# Patient Record
Sex: FEMALE | Race: BLACK OR AFRICAN AMERICAN
[De-identification: names, ages, dates, MRNs, and addresses within clinical notes are randomized per-mention and may not be internally consistent; named-entity substitution may affect disease eponyms.]

---

## 2018-01-11 ENCOUNTER — HOSPITAL ENCOUNTER (EMERGENCY)
Dept: HOSPITAL 5 - ED | Age: 73
LOS: 1 days | Discharge: HOME | End: 2018-01-12
Payer: MEDICARE

## 2018-01-11 DIAGNOSIS — N39.0: ICD-10-CM

## 2018-01-11 DIAGNOSIS — Z95.818: ICD-10-CM

## 2018-01-11 DIAGNOSIS — I25.2: ICD-10-CM

## 2018-01-11 DIAGNOSIS — E11.9: ICD-10-CM

## 2018-01-11 DIAGNOSIS — I10: ICD-10-CM

## 2018-01-11 DIAGNOSIS — R53.1: Primary | ICD-10-CM

## 2018-01-11 PROCEDURE — 74176 CT ABD & PELVIS W/O CONTRAST: CPT

## 2018-01-11 PROCEDURE — 80053 COMPREHEN METABOLIC PANEL: CPT

## 2018-01-11 PROCEDURE — 99285 EMERGENCY DEPT VISIT HI MDM: CPT

## 2018-01-11 PROCEDURE — 71045 X-RAY EXAM CHEST 1 VIEW: CPT

## 2018-01-11 PROCEDURE — 93010 ELECTROCARDIOGRAM REPORT: CPT

## 2018-01-11 PROCEDURE — 36415 COLL VENOUS BLD VENIPUNCTURE: CPT

## 2018-01-11 PROCEDURE — 93005 ELECTROCARDIOGRAM TRACING: CPT

## 2018-01-11 PROCEDURE — 72131 CT LUMBAR SPINE W/O DYE: CPT

## 2018-01-11 PROCEDURE — 70450 CT HEAD/BRAIN W/O DYE: CPT

## 2018-01-11 PROCEDURE — 84484 ASSAY OF TROPONIN QUANT: CPT

## 2018-01-11 PROCEDURE — 81001 URINALYSIS AUTO W/SCOPE: CPT

## 2018-01-11 PROCEDURE — 85025 COMPLETE CBC W/AUTO DIFF WBC: CPT

## 2018-01-12 VITALS — DIASTOLIC BLOOD PRESSURE: 45 MMHG | SYSTOLIC BLOOD PRESSURE: 142 MMHG

## 2018-01-12 LAB
ALBUMIN SERPL-MCNC: 2.9 G/DL (ref 3.9–5)
ALT SERPL-CCNC: 18 UNITS/L (ref 7–56)
BASOPHILS # (AUTO): 0 K/MM3 (ref 0–0.1)
BASOPHILS NFR BLD AUTO: 0.3 % (ref 0–1.8)
BILIRUB UR QL STRIP: (no result)
BLOOD UR QL VISUAL: (no result)
BUN SERPL-MCNC: 22 MG/DL (ref 7–17)
BUN/CREAT SERPL: 37 %
CALCIUM SERPL-MCNC: 9 MG/DL (ref 8.4–10.2)
EOSINOPHIL # BLD AUTO: 0.1 K/MM3 (ref 0–0.4)
EOSINOPHIL NFR BLD AUTO: 1 % (ref 0–4.3)
HCT VFR BLD CALC: 33.9 % (ref 30.3–42.9)
HEMOLYSIS INDEX: 6
HGB BLD-MCNC: 11.6 GM/DL (ref 10.1–14.3)
LYMPHOCYTES # BLD AUTO: 1.7 K/MM3 (ref 1.2–5.4)
LYMPHOCYTES NFR BLD AUTO: 20.3 % (ref 13.4–35)
MCH RBC QN AUTO: 31 PG (ref 28–32)
MCHC RBC AUTO-ENTMCNC: 34 % (ref 30–34)
MCV RBC AUTO: 90 FL (ref 79–97)
MONOCYTES # (AUTO): 0.9 K/MM3 (ref 0–0.8)
MONOCYTES % (AUTO): 11.4 % (ref 0–7.3)
MUCOUS THREADS #/AREA URNS HPF: (no result) /HPF
NITRITE UR QL STRIP: (no result)
PH UR STRIP: 6 [PH] (ref 5–7)
PLATELET # BLD: 274 K/MM3 (ref 140–440)
RBC # BLD AUTO: 3.75 M/MM3 (ref 3.65–5.03)
RBC #/AREA URNS HPF: 2 /HPF (ref 0–6)
UROBILINOGEN UR-MCNC: 2 MG/DL (ref ?–2)
WBC #/AREA URNS HPF: 12 /HPF (ref 0–6)

## 2018-01-12 NOTE — CAT SCAN REPORT
FINAL REPORT



PROCEDURE:  CT HEAD/BRAIN WO CON



TECHNIQUE:  Computerized tomography of the head was performed

without contrast material. 



HISTORY:  Weakness 



COMPARISON:  No prior studies are available for comparison.



FINDINGS:  

Skull and scalp: Normal.



Paranasal sinuses: Normal.



Ventricles and subarachnoid spaces: There is moderate central and

cortical atrophy. There is no hydrocephalus..



Cerebrum: No evidence of hemorrhage, acute infarction or mass .



Cerebellum and brainstem: No evidence of hemorrhage, acute

infarction or mass.



Vasculature: Normal.



Comments: None.



IMPRESSION:  

There is no acute intracranial abnormality.

## 2018-01-12 NOTE — CAT SCAN REPORT
FINAL REPORT



PROCEDURE:  CT ABDOMEN PELVIS WO CON



TECHNIQUE:  Computerized axial tomography of the abdomen and

pelvis was performed without intravenous contrast. This study is

performed without intravascular contrast material and its

sensitivity for abdominal and pelvic pathology, including

neoplasms, inflammation, abscess, free fluid, thrombosis,

arterial dissection and infarction, is reduced compared with a

contrast enhanced study. 



HISTORY:  pain 



COMPARISON:  No prior studies are available for comparison.



FINDINGS:  

Visualized lower thorax: No significant abnormality.



Liver: Normal size and attenuation.



Spleen: Normal size and attenuation.



Gallbladder and biliary system: There are gallstones. There is no

cholecystitis or biliary ductal dilatation..



Pancreas: Normal.



Adrenals: Normal.



Kidneys: There are no kidney stones or ureteral stones. There is

no hydronephrosis..



GI tract: There is no bowel obstruction, colitis or enteritis.

The appendix is normal..



Lymph nodes and mesentery: Normal.



Vasculature: There is calcified plaque in the abdominal aorta.

There is no aneurysm..



Bladder: The urinary bladder is distended..



Reproductive organs: Normal.



Peritoneum: There is no ascites or free air, abscess or

adenopathy..



Musculoskeletal structures: No significant abnormality.



Other: None.



IMPRESSION:  





There are gallstones. There is no cholecystitis or biliary ductal

dilatation..



There are no kidney stones or ureteral stones. There is no

hydronephrosis..



There is no bowel obstruction, colitis or enteritis. The appendix

is normal..



The urinary bladder is distended..



There is no ascites or free air, abscess or adenopathy..



.

## 2018-01-12 NOTE — EMERGENCY DEPARTMENT REPORT
- General


Chief complaint: Weakness


Stated complaint: UNABLE TO MOVE LEGS


Time Seen by Provider: 01/12/18 01:11


Source: EMS, 


Mode of arrival: Stretcher


Limitations: Language Barrier





- History of Present Illness


Initial comments: 





Family alf patient states she is weak and  get out of bed states it hurts 

to walk here for evaluation of weakness and not quite right today no history of 

chest pain no headache no stiff neck no fever generalized weakness


-: hour(s)


Location: generalized


Severity: mild, moderate


Associated Symptoms: denies: chest pain, dark stools, diaphoresis, easy bruising

, fever/chills, headaches, loss of appetite, nausea/vomiting, myalgias, rash, 

shortness of breath, syncope





- Related Data


 Home Medications











 Medication  Instructions  Recorded  Confirmed  Last Taken


 


Aspirin [Aspirin TAB] 325 mg PO DAILY 05/01/14 03/10/15 Unknown


 


Carvedilol [Coreg] 25 mg PO BID 05/01/14 03/10/15 Unknown


 


Ranitidine HCl [Zantac] 150 mg PO BID 05/01/14 03/10/15 Unknown


 


Zolpidem [Ambien] 5 mg PO QHS 05/01/14 03/10/15 Unknown


 


metFORMIN [Glucophage] 850 mg PO TID 05/01/14 03/10/15 Unknown


 


Ibuprofen [Motrin 400 MG tab] 400 mg PO Q8H PRN 03/10/15 03/10/15 Unknown


 


Loratadine [Claritin] 10 mg PO QDAY 03/10/15 03/10/15 Unknown








 Previous Rx's











 Medication  Instructions  Recorded  Last Taken  Type


 


Isosorbide Mononitrate [Isosorbide 60 mg PO QDAY #30 tab.er.24h 05/02/14 

Unknown Rx





Mononitrate ER (IMDUR)]    


 


Lisinopril [Zestril TAB] 10 mg PO QDAY #30 tablet 03/11/15 Unknown Rx


 


Rosuvastatin (Nf) [Crestor] 20 mg PO QHS #30 tablet 03/11/15 Unknown Rx


 


Cephalexin [Keflex] 500 mg PO Q6H #28 capsule 03/30/15 Unknown Rx


 


glipiZIDE [glipiZIDE ER] 10 mg PO BID #60 tab.er.24 03/30/15 Unknown Rx


 


Dicyclomine [Bentyl] 10 mg PO QID PRN #20 capsule 11/03/15 Unknown Rx


 


Ondansetron [Zofran Odt] 4 mg PO QID PRN #20 tab.rapdis 11/03/15 Unknown Rx


 


Cephalexin [Keflex] 500 mg PO Q6HR #28 capsule 01/12/18 Unknown Rx











 Allergies











Allergy/AdvReac Type Severity Reaction Status Date / Time


 


No Known Allergies Allergy   Unverified 05/01/14 05:59














ED Review of Systems


ROS: 


Stated complaint: UNABLE TO MOVE LEGS


Other details as noted in HPI





Comment: All other systems reviewed and negative


Eyes: denies: eye discharge, vision change


ENT: denies: dental pain, hearing loss, epistaxis


Respiratory: denies: shortness of breath, SOB with exertion, SOB at rest, 

stridor, wheezing


Cardiovascular: denies: chest pain, palpitations, dyspnea on exertion, orthopnea

, edema, syncope, paroxysmal nocturnal dyspnea


Gastrointestinal: denies: abdominal pain, nausea, vomiting, diarrhea, 

constipation, hematemesis, melena, hematochezia


Neurological: weakness.  denies: headache, numbness, paresthesias, confusion, 

abnormal gait, vertigo





ED Past Medical Hx





- Past Medical History


Previous Medical History?: Yes


Hx Hypertension: Yes


Hx Heart Attack/AMI: Yes


Hx Diabetes: Yes





- Surgical History


Past Surgical History?: Yes


Hx Coronary Stent: Yes





- Social History


Smoking Status: Never Smoker


Substance Use Type: None





- Medications


Home Medications: 


 Home Medications











 Medication  Instructions  Recorded  Confirmed  Last Taken  Type


 


Aspirin [Aspirin TAB] 325 mg PO DAILY 05/01/14 03/10/15 Unknown History


 


Carvedilol [Coreg] 25 mg PO BID 05/01/14 03/10/15 Unknown History


 


Ranitidine HCl [Zantac] 150 mg PO BID 05/01/14 03/10/15 Unknown History


 


Zolpidem [Ambien] 5 mg PO QHS 05/01/14 03/10/15 Unknown History


 


metFORMIN [Glucophage] 850 mg PO TID 05/01/14 03/10/15 Unknown History


 


Isosorbide Mononitrate [Isosorbide 60 mg PO QDAY #30 tab.er.24h 05/02/14 03/10/

15 Unknown Rx





Mononitrate ER (IMDUR)]     


 


Ibuprofen [Motrin 400 MG tab] 400 mg PO Q8H PRN 03/10/15 03/10/15 Unknown 

History


 


Loratadine [Claritin] 10 mg PO QDAY 03/10/15 03/10/15 Unknown History


 


Lisinopril [Zestril TAB] 10 mg PO QDAY #30 tablet 03/11/15  Unknown Rx


 


Rosuvastatin (Nf) [Crestor] 20 mg PO QHS #30 tablet 03/11/15  Unknown Rx


 


Cephalexin [Keflex] 500 mg PO Q6H #28 capsule 03/30/15  Unknown Rx


 


glipiZIDE [glipiZIDE ER] 10 mg PO BID #60 tab.er.24 03/30/15  Unknown Rx


 


Dicyclomine [Bentyl] 10 mg PO QID PRN #20 capsule 11/03/15  Unknown Rx


 


Ondansetron [Zofran Odt] 4 mg PO QID PRN #20 tab.rapdis 11/03/15  Unknown Rx


 


Cephalexin [Keflex] 500 mg PO Q6HR #28 capsule 01/12/18  Unknown Rx














ED Physical Exam





- General


Limitations: Language Barrier


General appearance: alert





- Head


Head exam: Present: atraumatic, normocephalic





- Eye


Eye exam: Present: PERRL, EOMI





- ENT


ENT exam: Present: normal exam, normal orophraynx





- Neck


Neck exam: Present: normal inspection.  Absent: tenderness, meningismus





- Respiratory


Respiratory exam: Present: normal lung sounds bilaterally.  Absent: respiratory 

distress, wheezes, rales, rhonchi, stridor, chest wall tenderness, accessory 

muscle use, decreased breath sounds, prolonged expiratory





- Cardiovascular


Cardiovascular Exam: Present: regular rate, normal heart sounds.  Absent: rubs, 

gallop





- GI/Abdominal


GI/Abdominal exam: Present: soft.  Absent: distended, tenderness, guarding, 

rebound, mass, bruit, pulsatile mass





- Extremities Exam


Extremities exam: Present: normal inspection.  Absent: pedal edema, joint 

swelling, calf tenderness





- Back Exam


Back exam: Present: other (no warmth or erythema supple neck).  Absent: CVA 

tenderness (R), CVA tenderness (L), paraspinal tenderness, vertebral tenderness





- Neurological Exam


Neurological exam: Present: alert, CN II-XII intact.  Absent: motor sensory 

deficit





- Skin


Skin exam: Absent: diaphoretic, erythema, urticaria, vesicles, petechiae, pallor

, abrasion, ecchymosis





ED Course


 Vital Signs











  01/11/18 01/12/18 01/12/18





  23:34 00:01 01:00


 


Temperature 99.3 F  


 


Pulse Rate 72 72 70


 


Respiratory 12 17 17





Rate   


 


Blood Pressure 157/75 160/107 164/74


 


Blood Pressure 157/75  





[Right]   


 


O2 Sat by Pulse 98 97 95





Oximetry   














- Reevaluation(s)


Reevaluation #1: 





01/12/18 04:25


Family states generalized weakness motor strength is equal family thinks having 

some pain when she tries to get out of bed and walk more that she was low but 

less responsive today and here with generalized weakness





ED Medical Decision Making





- Lab Data


Result diagrams: 


 01/12/18 01:40





 01/12/18 01:40





- EKG Data


-: EKG Interpreted by Me


EKG shows normal: sinus rhythm, ST-T waves (no acute ischemic change)





- Radiology Data


Radiology results: report reviewed





- Medical Decision Making





Troponin is negative CT head is no acute process chest x-rays negative 

abdominal CT and lumbar spine CT are chronic change patient is noted to have a 

UTI we will discharge her with antibiotics to see her regular doctor in 2 days 

no acute abdomen at this time no evidence of spinal cord compressive syndrome 

or other emergent process would require admission or further evaluation is 

noted at this time she is averse to 5 days follow-up


Critical care attestation.: 


If time is entered above; I have spent that time in minutes in the direct care 

of this critically ill patient, excluding procedure time.








ED Disposition


Clinical Impression: 


 UTI (urinary tract infection), Weakness





Disposition: DC-01 TO HOME OR SELFCARE


Is pt being admited?: No


Does the pt Need Aspirin: No


Condition: Stable


Instructions:  Weakness (ED), Urinary Tract Infection in Women (ED)


Additional Instructions: 


return if worse


Prescriptions: 


Cephalexin [Keflex] 500 mg PO Q6HR #28 capsule


Referrals: 


SHERMAN MCMULLEN MD [Primary Care Provider] - 3-5 Days


Time of Disposition: 04:30

## 2018-01-12 NOTE — CAT SCAN REPORT
FINAL REPORT



EXAM:  CT LUMBAR SPINE WO CON



HISTORY:  pain 



COMPARISON:  CT of the abdomen and pelvis from the same date. 



TECHNIQUE:  Contiguous axial images were obtained. Additional

sagittal and coronal reformatted images were obtained. 



FINDINGS:  

Lumbar vertebral body heights are preserved. No acute fracture

traumatic subluxation. Marked osteopenia. 



Mild to moderate endplate osteophyte throughout the lumbar spine.

Minimal loss of disc height L5-S1 level. Vacuum disc phenomena

L4-L5 level. 



No significant canal or foraminal narrowing at the T12-L1 and

L1-L2 levels. 



Mild facet changes mild broad-based disc bulge L2-L3 level

causing mild canal stenosis and mild foraminal narrowing. 



L3-L4 level, broad-based disc bulge with facet changes and

hypertrophy lumen flavum causing moderate canal stenosis and mild

bilateral foraminal narrowing. 



L4-L5 level, there is a broad-based disc bulge with

mild-to-moderate facet changes causing moderate canal stenosis.

Mild foraminal narrowing bilaterally. 



L5-S1 level, broad-based disc bulge with endplate osteophyte and

facet changes. Mild to moderate canal stenosis. Moderate to

severe left and mild right foraminal narrowing. 



Visualized SI joints are grossly unremarkable. 



IMPRESSION:  

No acute fracture traumatic subluxation of the lumbar spine. 



Moderate degenerative changes of the lumbar spine most pronounced

at the L4-L5 and L5-S1 levels. Moderate canal stenosis and mild

foraminal narrowing at the L4-L5 level and mild to moderate canal

stenosis with moderate severe left and mild right foraminal

narrowing at the L5-S1 level.

## 2018-01-12 NOTE — XRAY REPORT
FINAL REPORT



PROCEDURE:  XR CHEST 1V AP



TECHNIQUE:  Chest radiograph anteroposterior view. CPT 79914







HISTORY:  Weakness 



COMPARISON:  No prior studies are available for comparison.



FINDINGS:  

Heart: Normal.



Mediastinum/Vessels: Normal.



Lungs/Pleural space: Lungs are clear and expanded. There are no

infiltrates, effusions or pneumothoraces..



Bony thorax: No acute osseous abnormality.



Life support devices: None.



IMPRESSION:  

No acute cardiopulmonary abnormality.

## 2018-04-07 ENCOUNTER — HOSPITAL ENCOUNTER (EMERGENCY)
Dept: HOSPITAL 5 - ED | Age: 73
Discharge: HOME | End: 2018-04-07
Payer: MEDICARE

## 2018-04-07 VITALS — SYSTOLIC BLOOD PRESSURE: 161 MMHG | DIASTOLIC BLOOD PRESSURE: 77 MMHG

## 2018-04-07 DIAGNOSIS — I25.2: ICD-10-CM

## 2018-04-07 DIAGNOSIS — L03.116: Primary | ICD-10-CM

## 2018-04-07 DIAGNOSIS — I10: ICD-10-CM

## 2018-04-07 LAB
BASOPHILS # (AUTO): 0 K/MM3 (ref 0–0.1)
BASOPHILS NFR BLD AUTO: 0.7 % (ref 0–1.8)
BUN SERPL-MCNC: 13 MG/DL (ref 7–17)
BUN/CREAT SERPL: 22 %
CALCIUM SERPL-MCNC: 9.7 MG/DL (ref 8.4–10.2)
EOSINOPHIL # BLD AUTO: 0.1 K/MM3 (ref 0–0.4)
EOSINOPHIL NFR BLD AUTO: 1.3 % (ref 0–4.3)
HCT VFR BLD CALC: 36.5 % (ref 30.3–42.9)
HEMOLYSIS INDEX: 18
HGB BLD-MCNC: 12.1 GM/DL (ref 10.1–14.3)
LYMPHOCYTES # BLD AUTO: 1.7 K/MM3 (ref 1.2–5.4)
LYMPHOCYTES NFR BLD AUTO: 32.3 % (ref 13.4–35)
MCH RBC QN AUTO: 30 PG (ref 28–32)
MCHC RBC AUTO-ENTMCNC: 33 % (ref 30–34)
MCV RBC AUTO: 90 FL (ref 79–97)
MONOCYTES # (AUTO): 0.4 K/MM3 (ref 0–0.8)
MONOCYTES % (AUTO): 8.1 % (ref 0–7.3)
PLATELET # BLD: 427 K/MM3 (ref 140–440)
RBC # BLD AUTO: 4.08 M/MM3 (ref 3.65–5.03)

## 2018-04-07 PROCEDURE — 80048 BASIC METABOLIC PNL TOTAL CA: CPT

## 2018-04-07 PROCEDURE — 99283 EMERGENCY DEPT VISIT LOW MDM: CPT

## 2018-04-07 PROCEDURE — 96372 THER/PROPH/DIAG INJ SC/IM: CPT

## 2018-04-07 PROCEDURE — 36415 COLL VENOUS BLD VENIPUNCTURE: CPT

## 2018-04-07 PROCEDURE — 85025 COMPLETE CBC W/AUTO DIFF WBC: CPT

## 2018-04-07 NOTE — EMERGENCY DEPARTMENT REPORT
- General


Chief complaint: Extremity Injury, Lower


Stated complaint: LEFT FOOT PAIN


Time Seen by Provider: 04/07/18 15:48


Source: family


Mode of arrival: Ambulatory


Limitations: Language Barrier





- History of Present Illness


Initial comments: 





Patient sent here by PCP for left foot pain and redness.


-: week(s) (4)


Location: L foot


Severity: mild


Severity scale (0 -10): 4


Quality: burning


Consistency: constant


Improves with: none


Worsens with: palpation, movement


Associated symptoms: denies other symptoms


Treatments Prior to Arrival: none





- Related Data


 Home Medications











 Medication  Instructions  Recorded  Confirmed  Last Taken


 


Aspirin [Aspirin TAB] 325 mg PO DAILY 05/01/14 03/10/15 Unknown


 


Carvedilol [Coreg] 25 mg PO BID 05/01/14 03/10/15 Unknown


 


Ranitidine HCl [Zantac] 150 mg PO BID 05/01/14 03/10/15 Unknown


 


Zolpidem [Ambien] 5 mg PO QHS 05/01/14 03/10/15 Unknown


 


metFORMIN [Glucophage] 850 mg PO TID 05/01/14 03/10/15 Unknown


 


Ibuprofen [Motrin 400 MG tab] 400 mg PO Q8H PRN 03/10/15 03/10/15 Unknown


 


Loratadine [Claritin] 10 mg PO QDAY 03/10/15 03/10/15 Unknown








 Previous Rx's











 Medication  Instructions  Recorded  Last Taken  Type


 


Isosorbide Mononitrate [Isosorbide 60 mg PO QDAY #30 tab.er.24h 05/02/14 

Unknown Rx





Mononitrate ER (IMDUR)]    


 


Lisinopril [Zestril TAB] 10 mg PO QDAY #30 tablet 03/11/15 Unknown Rx


 


Rosuvastatin (Nf) [Crestor] 20 mg PO QHS #30 tablet 03/11/15 Unknown Rx


 


Cephalexin [Keflex] 500 mg PO Q6H #28 capsule 03/30/15 Unknown Rx


 


glipiZIDE [glipiZIDE ER] 10 mg PO BID #60 tab.er.24 03/30/15 Unknown Rx


 


Dicyclomine [Bentyl] 10 mg PO QID PRN #20 capsule 11/03/15 Unknown Rx


 


Ondansetron [Zofran Odt] 4 mg PO QID PRN #20 tab.rapdis 11/03/15 Unknown Rx


 


Cephalexin [Keflex] 500 mg PO Q6HR #28 capsule 01/12/18 Unknown Rx


 


Cephalexin [Keflex] 500 mg PO 6XD 7 Days #28 capsule 04/07/18 Unknown Rx











 Allergies











Allergy/AdvReac Type Severity Reaction Status Date / Time


 


No Known Allergies Allergy   Unverified 05/01/14 05:59














Abscess Boil HPI





- HPI


Chief Complaint: Extremity Injury, Lower


Stated Complaint: LEFT FOOT PAIN


Time Seen by Provider: 04/07/18 15:48


Home Medications: 


 Home Medications











 Medication  Instructions  Recorded  Confirmed  Last Taken


 


Aspirin [Aspirin TAB] 325 mg PO DAILY 05/01/14 03/10/15 Unknown


 


Carvedilol [Coreg] 25 mg PO BID 05/01/14 03/10/15 Unknown


 


Ranitidine HCl [Zantac] 150 mg PO BID 05/01/14 03/10/15 Unknown


 


Zolpidem [Ambien] 5 mg PO QHS 05/01/14 03/10/15 Unknown


 


metFORMIN [Glucophage] 850 mg PO TID 05/01/14 03/10/15 Unknown


 


Ibuprofen [Motrin 400 MG tab] 400 mg PO Q8H PRN 03/10/15 03/10/15 Unknown


 


Loratadine [Claritin] 10 mg PO QDAY 03/10/15 03/10/15 Unknown








 Previous Rx's











 Medication  Instructions  Recorded  Last Taken  Type


 


Isosorbide Mononitrate [Isosorbide 60 mg PO QDAY #30 tab.er.24h 05/02/14 

Unknown Rx





Mononitrate ER (IMDUR)]    


 


Lisinopril [Zestril TAB] 10 mg PO QDAY #30 tablet 03/11/15 Unknown Rx


 


Rosuvastatin (Nf) [Crestor] 20 mg PO QHS #30 tablet 03/11/15 Unknown Rx


 


Cephalexin [Keflex] 500 mg PO Q6H #28 capsule 03/30/15 Unknown Rx


 


glipiZIDE [glipiZIDE ER] 10 mg PO BID #60 tab.er.24 03/30/15 Unknown Rx


 


Dicyclomine [Bentyl] 10 mg PO QID PRN #20 capsule 11/03/15 Unknown Rx


 


Ondansetron [Zofran Odt] 4 mg PO QID PRN #20 tab.rapdis 11/03/15 Unknown Rx


 


Cephalexin [Keflex] 500 mg PO Q6HR #28 capsule 01/12/18 Unknown Rx


 


Cephalexin [Keflex] 500 mg PO 6XD 7 Days #28 capsule 04/07/18 Unknown Rx











Allergies/Adverse Reactions: 


 Allergies











Allergy/AdvReac Type Severity Reaction Status Date / Time


 


No Known Allergies Allergy   Unverified 05/01/14 05:59














ED Review of Systems


ROS: 


Stated complaint: LEFT FOOT PAIN


Other details as noted in HPI





Comment: All other systems reviewed and negative


Constitutional: denies: chills, fever


Eyes: denies: eye pain, eye discharge, vision change


ENT: denies: ear pain, throat pain


Respiratory: denies: cough, shortness of breath, wheezing


Cardiovascular: denies: chest pain, palpitations


Endocrine: no symptoms reported


Gastrointestinal: denies: abdominal pain, nausea, diarrhea


Genitourinary: denies: urgency, dysuria, discharge


Musculoskeletal: denies: back pain, joint swelling, arthralgia


Skin: denies: rash, lesions


Neurological: denies: headache, weakness, paresthesias


Psychiatric: denies: anxiety, depression


Hematological/Lymphatic: denies: easy bleeding, easy bruising





ED Past Medical Hx





- Past Medical History


Previous Medical History?: Yes


Hx Hypertension: Yes


Hx Heart Attack/AMI: Yes


Hx Diabetes: Yes





- Surgical History


Past Surgical History?: Yes


Hx Coronary Stent: Yes





- Social History


Smoking Status: Never Smoker


Substance Use Type: None





- Medications


Home Medications: 


 Home Medications











 Medication  Instructions  Recorded  Confirmed  Last Taken  Type


 


Aspirin [Aspirin TAB] 325 mg PO DAILY 05/01/14 03/10/15 Unknown History


 


Carvedilol [Coreg] 25 mg PO BID 05/01/14 03/10/15 Unknown History


 


Ranitidine HCl [Zantac] 150 mg PO BID 05/01/14 03/10/15 Unknown History


 


Zolpidem [Ambien] 5 mg PO QHS 05/01/14 03/10/15 Unknown History


 


metFORMIN [Glucophage] 850 mg PO TID 05/01/14 03/10/15 Unknown History


 


Isosorbide Mononitrate [Isosorbide 60 mg PO QDAY #30 tab.er.24h 05/02/14 03/10/

15 Unknown Rx





Mononitrate ER (IMDUR)]     


 


Ibuprofen [Motrin 400 MG tab] 400 mg PO Q8H PRN 03/10/15 03/10/15 Unknown 

History


 


Loratadine [Claritin] 10 mg PO QDAY 03/10/15 03/10/15 Unknown History


 


Lisinopril [Zestril TAB] 10 mg PO QDAY #30 tablet 03/11/15  Unknown Rx


 


Rosuvastatin (Nf) [Crestor] 20 mg PO QHS #30 tablet 03/11/15  Unknown Rx


 


Cephalexin [Keflex] 500 mg PO Q6H #28 capsule 03/30/15  Unknown Rx


 


glipiZIDE [glipiZIDE ER] 10 mg PO BID #60 tab.er.24 03/30/15  Unknown Rx


 


Dicyclomine [Bentyl] 10 mg PO QID PRN #20 capsule 11/03/15  Unknown Rx


 


Ondansetron [Zofran Odt] 4 mg PO QID PRN #20 tab.rapdis 11/03/15  Unknown Rx


 


Cephalexin [Keflex] 500 mg PO Q6HR #28 capsule 01/12/18  Unknown Rx


 


Cephalexin [Keflex] 500 mg PO 6XD 7 Days #28 capsule 04/07/18  Unknown Rx














ED Physical Exam





- General


Limitations: Language Barrier


General appearance: alert, in no apparent distress





- Head


Head exam: Present: atraumatic, normocephalic





- Eye


Eye exam: Present: normal appearance





- ENT


ENT exam: Present: mucous membranes moist





- Neck


Neck exam: Present: normal inspection





- Respiratory


Respiratory exam: Present: normal lung sounds bilaterally.  Absent: respiratory 

distress





- Cardiovascular


Cardiovascular Exam: Present: regular rate, normal rhythm.  Absent: systolic 

murmur, diastolic murmur, rubs, gallop





- GI/Abdominal


GI/Abdominal exam: Present: soft, normal bowel sounds





- Extremities Exam


Extremities exam: Present: normal inspection





- Expanded Lower Extremity Exam


  ** Left


Foot/Toe exam: Present: tenderness, erythema


Neuro vascular tendon exam: Present: no vascular compromise





- Back Exam


Back exam: Present: normal inspection





- Neurological Exam


Neurological exam: Present: alert, oriented X3





- Psychiatric


Psychiatric exam: Present: normal affect, normal mood





- Skin


Skin exam: Present: warm, dry, intact, normal color.  Absent: rash





ED Course





 Vital Signs











  04/07/18





  13:36


 


Temperature 98.6 F


 


Pulse Rate 86


 


Respiratory 16





Rate 


 


Blood Pressure 154/76


 


O2 Sat by Pulse 100





Oximetry 














- Reevaluation(s)


Reevaluation #1: 





04/07/18 16:08


No other complaints.  Patient resting in bed comfortably.





ED Medical Decision Making





- Lab Data


Result diagrams: 


 04/07/18 15:06





 04/07/18 14:11





- Medical Decision Making





Cellulitis of Left foot.  Rx Abx


Critical care attestation.: 


If time is entered above; I have spent that time in minutes in the direct care 

of this critically ill patient, excluding procedure time.








ED Disposition


Clinical Impression: 


 Cellulitis of left foot





Disposition: DC-01 TO HOME OR SELFCARE


Is pt being admited?: No


Does the pt Need Aspirin: No


Condition: Stable


Prescriptions: 


Cephalexin [Keflex] 500 mg PO 6XD 7 Days #28 capsule


Referrals: 


PRIMARY CARE,MD [Primary Care Provider] - 3-5 Days

## 2020-10-11 ENCOUNTER — HOSPITAL ENCOUNTER (EMERGENCY)
Dept: HOSPITAL 5 - ED | Age: 75
LOS: 1 days | Discharge: OTHER INSTITUTION | End: 2020-10-12
Payer: COMMERCIAL

## 2020-10-11 DIAGNOSIS — R07.9: ICD-10-CM

## 2020-10-11 DIAGNOSIS — I25.10: ICD-10-CM

## 2020-10-11 DIAGNOSIS — Z95.5: ICD-10-CM

## 2020-10-11 DIAGNOSIS — Z79.899: ICD-10-CM

## 2020-10-11 DIAGNOSIS — I10: ICD-10-CM

## 2020-10-11 DIAGNOSIS — I25.2: ICD-10-CM

## 2020-10-11 DIAGNOSIS — F03.90: ICD-10-CM

## 2020-10-11 DIAGNOSIS — R55: Primary | ICD-10-CM

## 2020-10-11 DIAGNOSIS — E11.9: ICD-10-CM

## 2020-10-11 LAB
ALBUMIN SERPL-MCNC: 2.9 G/DL (ref 3.9–5)
ALT SERPL-CCNC: 23 UNITS/L (ref 7–56)
APTT BLD: 23.9 SEC. (ref 24.2–36.6)
BASOPHILS # (AUTO): 0 K/MM3 (ref 0–0.1)
BASOPHILS NFR BLD AUTO: 0.5 % (ref 0–1.8)
BILIRUB DIRECT SERPL-MCNC: < 0.2 MG/DL (ref 0–0.2)
BUN SERPL-MCNC: 33 MG/DL (ref 7–17)
BUN/CREAT SERPL: 33 %
CALCIUM SERPL-MCNC: 8.6 MG/DL (ref 8.4–10.2)
EOSINOPHIL # BLD AUTO: 0.1 K/MM3 (ref 0–0.4)
EOSINOPHIL NFR BLD AUTO: 1.7 % (ref 0–4.3)
HCT VFR BLD CALC: 32.7 % (ref 30.3–42.9)
HEMOLYSIS INDEX: 3
HGB BLD-MCNC: 11.8 GM/DL (ref 10.1–14.3)
INR PPP: 0.92 (ref 0.87–1.13)
LYMPHOCYTES # BLD AUTO: 1.2 K/MM3 (ref 1.2–5.4)
LYMPHOCYTES NFR BLD AUTO: 20.5 % (ref 13.4–35)
MCHC RBC AUTO-ENTMCNC: 36 % (ref 30–34)
MCV RBC AUTO: 94 FL (ref 79–97)
MONOCYTES # (AUTO): 0.4 K/MM3 (ref 0–0.8)
MONOCYTES % (AUTO): 7.5 % (ref 0–7.3)
PLATELET # BLD: 278 K/MM3 (ref 140–440)
RBC # BLD AUTO: 3.47 M/MM3 (ref 3.65–5.03)

## 2020-10-11 PROCEDURE — 71275 CT ANGIOGRAPHY CHEST: CPT

## 2020-10-11 PROCEDURE — 71045 X-RAY EXAM CHEST 1 VIEW: CPT

## 2020-10-11 PROCEDURE — 85610 PROTHROMBIN TIME: CPT

## 2020-10-11 PROCEDURE — 96360 HYDRATION IV INFUSION INIT: CPT

## 2020-10-11 PROCEDURE — 85025 COMPLETE CBC W/AUTO DIFF WBC: CPT

## 2020-10-11 PROCEDURE — 80048 BASIC METABOLIC PNL TOTAL CA: CPT

## 2020-10-11 PROCEDURE — 36415 COLL VENOUS BLD VENIPUNCTURE: CPT

## 2020-10-11 PROCEDURE — 93005 ELECTROCARDIOGRAM TRACING: CPT

## 2020-10-11 PROCEDURE — 85730 THROMBOPLASTIN TIME PARTIAL: CPT

## 2020-10-11 PROCEDURE — 96361 HYDRATE IV INFUSION ADD-ON: CPT

## 2020-10-11 PROCEDURE — 85379 FIBRIN DEGRADATION QUANT: CPT

## 2020-10-11 PROCEDURE — 70450 CT HEAD/BRAIN W/O DYE: CPT

## 2020-10-11 PROCEDURE — 80076 HEPATIC FUNCTION PANEL: CPT

## 2020-10-11 PROCEDURE — 99285 EMERGENCY DEPT VISIT HI MDM: CPT

## 2020-10-11 PROCEDURE — 84484 ASSAY OF TROPONIN QUANT: CPT

## 2020-10-11 NOTE — EMERGENCY DEPARTMENT REPORT
ED Syncope HPI





- General


Chief Complaint: Syncope


Stated Complaint: SYCOPAL EPISODE


Time Seen by Provider: 10/11/20 19:22


Source: family


Exam Limitations: language barrier





- History of Present Illness


Initial Comments: 





74-year-old female, history of dementia, CAD with stents, diabetes presents to 

ED following syncopal episode at home.  I spoke to patient's son who states that

patient passed out while at home.  Patient also complained of some chest pain.  

Dates she has had some decreased p.o. intake today being as much as she usually 

does.  He denies patient has been having any fever, cough, shortness of breath, 

vomiting.


Timing/Prior Episodes: single episode today


Context: sitting


Loss of Consciousness: brief (seconds)


Current Symptoms: chest pain





- Related Data


Allergies/Adverse Reactions: 


Allergies





No Known Allergies Allergy (Unverified 05/01/14 05:59)


   








Home Medications: 


Ambulatory Orders





Aspirin 325 mg PO DAILY 05/01/14 


Zolpidem [Ambien] 5 mg PO QHS 05/01/14 


carvediloL [Coreg] 25 mg PO BID 05/01/14 


Isosorbide Mononitrate [Isosorbide Mononitrate ER (IMDUR)] 60 mg PO QDAY #30 

tab.er.24h 05/02/14 


Ibuprofen [Motrin 400 MG tab] 400 mg PO Q8H PRN 03/10/15 


Loratadine (Nf) [Claritin (Nf)] 10 mg PO QDAY 03/10/15 


Rosuvastatin (Nf) [Crestor] 20 mg PO QHS #30 tablet 03/11/15 


lisinopriL [Zestril TAB] 10 mg PO QDAY #30 tablet 03/11/15 


Dicyclomine [Bentyl] 10 mg PO QID PRN #20 capsule 11/03/15 


Ondansetron [Zofran Odt] 4 mg PO QID PRN #20 tab.rapdis 11/03/15 


metFORMIN [Glucophage] 850 mg PO BID #60 tablet 05/07/20 











ED Review of Systems


ROS: 


Stated complaint: SYCOPAL EPISODE


Other details as noted in HPI





Comment: All other systems reviewed and negative


Constitutional: denies: fever


Respiratory: denies: cough, shortness of breath


Cardiovascular: chest pain


Gastrointestinal: denies: vomiting





ED Past Medical Hx





- Past Medical History


Previous Medical History?: Yes


Hx Hypertension: Yes


Hx Heart Attack/AMI: Yes


Hx Diabetes: Yes


Hx Dementia: Yes





- Surgical History


Past Surgical History?: Yes


Hx Coronary Stent: Yes





- Social History


Smoking Status: Never Smoker





- Medications


Home Medications: 


                                Home Medications











 Medication  Instructions  Recorded  Confirmed  Last Taken  Type


 


Aspirin 325 mg PO DAILY 05/01/14 05/04/20 05/04/20 11:50 History


 


Zolpidem [Ambien] 5 mg PO QHS 05/01/14 05/04/20 Unknown History


 


carvediloL [Coreg] 25 mg PO BID 05/01/14 05/04/20 Unknown History


 


Isosorbide Mononitrate [Isosorbide 60 mg PO QDAY #30 tab.er.24h 05/02/14 05/04/20 Unknown Rx





Mononitrate ER (IMDUR)]     


 


Ibuprofen [Motrin 400 MG tab] 400 mg PO Q8H PRN 03/10/15 05/04/20 Unknown 

History


 


Loratadine (Nf) [Claritin (Nf)] 10 mg PO QDAY 03/10/15 05/04/20 Unknown History


 


Rosuvastatin (Nf) [Crestor] 20 mg PO QHS #30 tablet 03/11/15 05/04/20 Unknown Rx


 


lisinopriL [Zestril TAB] 10 mg PO QDAY #30 tablet 03/11/15 05/04/20 05/03/20 Rx


 


Dicyclomine [Bentyl] 10 mg PO QID PRN #20 capsule 11/03/15 05/04/20 Unknown Rx


 


Ondansetron [Zofran Odt] 4 mg PO QID PRN #20 tab.rapdis 11/03/15 05/04/20 

Unknown Rx


 


metFORMIN [Glucophage] 850 mg PO BID #60 tablet 05/07/20  Unknown Rx














ED Physical Exam





- General


Limitations: Language Barrier, Altered Mental Status


General appearance: alert, in no apparent distress





- Head


Head exam: Present: atraumatic, normocephalic





- Eye


Eye exam: Present: normal appearance





- ENT


ENT exam: Present: mucous membranes moist





- Neck


Neck exam: Present: normal inspection





- Respiratory


Respiratory exam: Present: normal lung sounds bilaterally.  Absent: respiratory 

distress





- Cardiovascular


Cardiovascular Exam: Present: regular rate, normal rhythm





- GI/Abdominal


GI/Abdominal exam: Present: soft.  Absent: distended, tenderness





- Extremities Exam


Extremities exam: Present: normal inspection





- Neurological Exam


Neurological exam: Present: alert, other (moves all extremities, follows 

commands)





- Psychiatric


Psychiatric exam: Present: normal affect, normal mood





- Skin


Skin exam: Present: warm, dry, intact, normal color





ED Course


                                   Vital Signs











  10/11/20 10/11/20 10/11/20





  19:30 19:46 19:56


 


Temperature   98.6 F


 


Pulse Rate 77 77 82


 


Respiratory 17 12 20





Rate   


 


Blood Pressure  126/63 


 


Blood Pressure   126/63





[Right]   


 


O2 Sat by Pulse  96 97





Oximetry   














  10/11/20 10/11/20 10/11/20





  19:57 20:00 20:16


 


Temperature   


 


Pulse Rate  71 73


 


Respiratory 20 18 16





Rate   


 


Blood Pressure  126/63 121/67


 


Blood Pressure   





[Right]   


 


O2 Sat by Pulse 97 97 97





Oximetry   














  10/11/20 10/11/20 10/11/20





  20:30 20:46 21:00


 


Temperature   


 


Pulse Rate 77 76 77


 


Respiratory 16 15 15





Rate   


 


Blood Pressure 121/67 121/67 121/67


 


Blood Pressure   





[Right]   


 


O2 Sat by Pulse 96 95 





Oximetry   














  10/11/20 10/11/20 10/11/20





  21:16 21:30 21:45


 


Temperature   


 


Pulse Rate 77 77 79


 


Respiratory 15 15 14





Rate   


 


Blood Pressure 115/58 115/58 115/58


 


Blood Pressure   





[Right]   


 


O2 Sat by Pulse 95 96 97





Oximetry   














- Consultations


Consultation #1: 





10/12/20 01:23


Spoke with Dr. Fontana at the [facility].  States patient will be 

transferred to [facility], accepting physician Dr. Mccain.  Transportation 

to be arranged by [facility].











ED Medical Decision Making





- Lab Data


Result diagrams: 


                                 10/11/20 19:41





                                 10/11/20 19:41





- EKG Data


-: EKG Interpreted by Me


EKG shows normal: sinus rhythm, axis, intervals, QRS complexes, ST-T waves


Rate: normal





- EKG Data


Interpretation: no acute changes





- Radiology Data


Radiology results: report reviewed, image reviewed





- Medical Decision Making





74-year-old female presents to ED with syncope and chest pain.  Vital signs are 

stable.  EKG is an unremarkable for any ST changes.  Troponin is negative x2.  

Potassium is mildly elevated at 5.3, however this may be due to slight hemolysis

 as renal function is normal.  Chest x-ray is negative.  D-dimer was elevated, 

so CTA chest was obtained, which is negative for any evidence of PE.  Patient 

has [employer] insurance, so [facility] on-call was contacted and has arranged for 

transfer to [facility].





- Differential Diagnosis


Dehydration, PE, ACS


Critical care attestation.: 


If time is entered above; I have spent that time in minutes in the direct care 

of this critically ill patient, excluding procedure time.








ED Disposition


Clinical Impression: 


 Syncope, Chest pain





Disposition: [id number]/TX-[id number] ANOTHER TYPE [facility]


Is pt being admited?: No


Condition: Stable


Instructions:  Syncope (ED), Chest Pain (ED)


Referrals: 


PRIMARY CARE,MD [Primary Care Provider] - 3-5 Days


Time of Disposition: 01:24

## 2020-10-11 NOTE — XRAY REPORT
CHEST 1 VIEW 10/11/2020 7:39 PM



INDICATION / CLINICAL INFORMATION: syncope.



COMPARISON: CT chest dated 5/2/2020.



FINDINGS:



SUPPORT DEVICES: None.

HEART / MEDIASTINUM: Stable. 

LUNGS / PLEURA: No significant pulmonary or pleural abnormality. No pneumothorax. 



ADDITIONAL FINDINGS: No significant additional findings.



IMPRESSION:

No acute cardiopulmonary abnormality.



Signer Name: Pedro Pablo Morrow MD 

Signed: 10/11/2020 8:08 PM

Workstation Name: [id number]

## 2020-10-12 VITALS — SYSTOLIC BLOOD PRESSURE: 145 MMHG | DIASTOLIC BLOOD PRESSURE: 100 MMHG

## 2020-10-12 NOTE — CAT SCAN REPORT
CTA CHEST WITH IV CONTRAST



INDICATION:

chest pain, syncope.



TECHNIQUE:

Axial CT images were obtained through the chest after injection of IV contrast. 3 plane MIP reconstru
ctions were produced. All CT scans at this location are performed using CT dose reduction for ALARA b
y means of automated exposure control. 



COMPARISON:

None available.



FINDINGS:

Pulmonary Arteries: No pulmonary emboli.

Thoracic Aorta: No acute abnormality.

Heart: Normal.

Lungs: No acute air space or interstitial disease. There is a stable 6 mm perifissural nodule along t
he minor fissure on series 2 image 53. Punctate density in the lateral right lower lobe on image 67 i
s unchanged as well.

Pleura: No pleural effusion. No pneumothorax.

Lymph Nodes: No significant adenopathy.

Additional Findings: None.



Upper Abdomen: There is a punctate gallstone. There is mild dilatation of both renal pelvises. There 
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
. There is a stable hypodense lesion in the spleen which is likely a cyst.



Skeletal Structures: There is moderate compression deformity at the T10 vertebral body, this is new s
marco the prior CT.



IMPRESSION:

1. No CT evidence for pulmonary embolism. 

2. No acute pulmonary or pleural findings. Stable subcentimeter nodules as above.

3. New since the prior CT from May, there is moderate compression deformity at the T10 vertebral body
.



Signer Name: Navi Judd MD 

Signed: 10/12/2020 12:48 AM

Workstation Name: [id number]

## 2020-10-12 NOTE — CAT SCAN REPORT
CT HEAD WITHOUT CONTRAST



INDICATION:

syncope.



TECHNIQUE:

All CT scans at this location are performed using CT dose reduction for ALARA by means of automated e
xposure control. 



COMPARISON:

CT 5/2/2020.



FINDINGS:

HEMORRHAGE: None.

EXTRA-AXIAL SPACES: Normal in size and morphology for the patient's age.

VENTRICULAR SYSTEM: Normal in size and morphology for the patient's age.

BRAIN PARENCHYMA: No acute findings. 

MIDLINE SHIFT OR HERNIATION: None.





ORBITS: Normal as visualized.

SOFT TISSUES OF HEAD: Normal.

CALVARIUM: Normal.

VISUALIZED PARANASAL SINUSES AND MASTOID AIR CELLS: Clear.



ADDITIONAL FINDINGS: None.



IMPRESSION:

1. No acute intracranial abnormality.



Signer Name: Navi Judd MD 

Signed: 10/12/2020 12:39 AM

Workstation Name: [email or web address]